# Patient Record
Sex: FEMALE | Race: WHITE | NOT HISPANIC OR LATINO
[De-identification: names, ages, dates, MRNs, and addresses within clinical notes are randomized per-mention and may not be internally consistent; named-entity substitution may affect disease eponyms.]

---

## 2017-12-12 PROBLEM — Z00.00 ENCOUNTER FOR PREVENTIVE HEALTH EXAMINATION: Status: ACTIVE | Noted: 2017-12-12

## 2017-12-29 ENCOUNTER — APPOINTMENT (OUTPATIENT)
Dept: CARDIOLOGY | Facility: CLINIC | Age: 82
End: 2017-12-29

## 2017-12-29 ENCOUNTER — APPOINTMENT (OUTPATIENT)
Dept: CARDIOLOGY | Facility: CLINIC | Age: 82
End: 2017-12-29
Payer: MEDICARE

## 2017-12-29 PROCEDURE — 99211 OFF/OP EST MAY X REQ PHY/QHP: CPT

## 2017-12-29 PROCEDURE — 85610 PROTHROMBIN TIME: CPT | Mod: QW

## 2018-01-11 ENCOUNTER — APPOINTMENT (OUTPATIENT)
Dept: CARDIOLOGY | Facility: CLINIC | Age: 83
End: 2018-01-11
Payer: MEDICARE

## 2018-01-11 PROCEDURE — 93000 ELECTROCARDIOGRAM COMPLETE: CPT

## 2018-01-11 PROCEDURE — 99214 OFFICE O/P EST MOD 30 MIN: CPT

## 2018-01-25 ENCOUNTER — RESULT REVIEW (OUTPATIENT)
Age: 83
End: 2018-01-25

## 2018-01-26 ENCOUNTER — RX RENEWAL (OUTPATIENT)
Age: 83
End: 2018-01-26

## 2018-01-26 LAB — INR PPP: 2.5

## 2018-02-08 ENCOUNTER — RESULT REVIEW (OUTPATIENT)
Age: 83
End: 2018-02-08

## 2018-02-08 LAB — INR PPP: 3

## 2018-02-22 ENCOUNTER — RX RENEWAL (OUTPATIENT)
Age: 83
End: 2018-02-22

## 2018-03-01 LAB — INR PPP: 4.9

## 2018-03-08 ENCOUNTER — MEDICATION RENEWAL (OUTPATIENT)
Age: 83
End: 2018-03-08

## 2018-03-09 LAB — INR PPP: 2.5

## 2018-03-23 LAB — INR PPP: 3.3

## 2018-04-09 ENCOUNTER — RECORD ABSTRACTING (OUTPATIENT)
Age: 83
End: 2018-04-09

## 2018-04-09 RX ORDER — ATORVASTATIN CALCIUM 10 MG/1
10 TABLET, FILM COATED ORAL DAILY
Refills: 0 | Status: ACTIVE | COMMUNITY

## 2018-04-09 RX ORDER — FUROSEMIDE 40 MG/1
40 TABLET ORAL DAILY
Qty: 90 | Refills: 0 | Status: ACTIVE | COMMUNITY

## 2018-04-10 ENCOUNTER — APPOINTMENT (OUTPATIENT)
Dept: CARDIOLOGY | Facility: CLINIC | Age: 83
End: 2018-04-10
Payer: MEDICARE

## 2018-04-10 VITALS
SYSTOLIC BLOOD PRESSURE: 128 MMHG | HEIGHT: 60 IN | DIASTOLIC BLOOD PRESSURE: 80 MMHG | BODY MASS INDEX: 32 KG/M2 | HEART RATE: 69 BPM | RESPIRATION RATE: 16 BRPM | WEIGHT: 163 LBS

## 2018-04-10 PROCEDURE — 99214 OFFICE O/P EST MOD 30 MIN: CPT

## 2018-04-10 PROCEDURE — 93000 ELECTROCARDIOGRAM COMPLETE: CPT

## 2018-04-16 LAB — INR PPP: 2.2

## 2018-04-20 LAB — INR PPP: 2.8

## 2018-05-08 LAB — INR PPP: 2.8

## 2018-05-17 ENCOUNTER — RESULT REVIEW (OUTPATIENT)
Age: 83
End: 2018-05-17

## 2018-05-17 LAB — INR PPP: 2.5

## 2018-06-04 ENCOUNTER — MEDICATION RENEWAL (OUTPATIENT)
Age: 83
End: 2018-06-04

## 2018-06-05 ENCOUNTER — MEDICATION RENEWAL (OUTPATIENT)
Age: 83
End: 2018-06-05

## 2018-06-21 LAB
INR PPP: 2.1
INR PPP: 2.4

## 2018-06-28 ENCOUNTER — RESULT REVIEW (OUTPATIENT)
Age: 83
End: 2018-06-28

## 2018-06-28 LAB — INR PPP: 1.9

## 2018-07-03 ENCOUNTER — APPOINTMENT (OUTPATIENT)
Dept: CARDIOLOGY | Facility: CLINIC | Age: 83
End: 2018-07-03
Payer: MEDICARE

## 2018-07-03 PROCEDURE — 93880 EXTRACRANIAL BILAT STUDY: CPT

## 2018-07-03 PROCEDURE — 93306 TTE W/DOPPLER COMPLETE: CPT

## 2018-07-10 ENCOUNTER — APPOINTMENT (OUTPATIENT)
Dept: CARDIOLOGY | Facility: CLINIC | Age: 83
End: 2018-07-10
Payer: MEDICARE

## 2018-07-10 VITALS
RESPIRATION RATE: 14 BRPM | SYSTOLIC BLOOD PRESSURE: 108 MMHG | DIASTOLIC BLOOD PRESSURE: 67 MMHG | WEIGHT: 162 LBS | HEIGHT: 60 IN | BODY MASS INDEX: 31.8 KG/M2 | HEART RATE: 70 BPM

## 2018-07-10 PROCEDURE — 93000 ELECTROCARDIOGRAM COMPLETE: CPT

## 2018-07-10 PROCEDURE — 99214 OFFICE O/P EST MOD 30 MIN: CPT

## 2018-07-10 RX ORDER — WARFARIN 2 MG/1
2 TABLET ORAL
Qty: 90 | Refills: 1 | Status: DISCONTINUED | COMMUNITY
Start: 2018-06-05 | End: 2018-07-10

## 2018-07-10 RX ORDER — WARFARIN 1 MG/1
1 TABLET ORAL
Qty: 90 | Refills: 1 | Status: DISCONTINUED | COMMUNITY
Start: 2018-06-04 | End: 2018-07-10

## 2018-07-10 RX ORDER — WARFARIN SODIUM 3 MG/1
3 TABLET ORAL
Refills: 0 | Status: DISCONTINUED | COMMUNITY
End: 2018-07-10

## 2018-07-20 ENCOUNTER — RX RENEWAL (OUTPATIENT)
Age: 83
End: 2018-07-20

## 2018-07-24 LAB — INR PPP: 2

## 2018-07-26 LAB — INR PPP: 1.7

## 2018-08-06 LAB — INR PPP: 1.9

## 2018-08-20 LAB — INR PPP: 2.8

## 2018-09-05 LAB — INR PPP: 3

## 2018-09-13 ENCOUNTER — MEDICATION RENEWAL (OUTPATIENT)
Age: 83
End: 2018-09-13

## 2018-09-13 ENCOUNTER — RX RENEWAL (OUTPATIENT)
Age: 83
End: 2018-09-13

## 2018-09-19 LAB — INR PPP: 2

## 2018-10-15 LAB — INR PPP: 1.9

## 2018-10-29 ENCOUNTER — MEDICATION RENEWAL (OUTPATIENT)
Age: 83
End: 2018-10-29

## 2018-10-29 LAB — INR PPP: 3.3

## 2018-11-01 ENCOUNTER — RECORD ABSTRACTING (OUTPATIENT)
Age: 83
End: 2018-11-01

## 2018-11-01 DIAGNOSIS — Z78.9 OTHER SPECIFIED HEALTH STATUS: ICD-10-CM

## 2018-11-08 ENCOUNTER — APPOINTMENT (OUTPATIENT)
Dept: CARDIOLOGY | Facility: CLINIC | Age: 83
End: 2018-11-08
Payer: MEDICARE

## 2018-11-08 VITALS
HEIGHT: 60 IN | HEART RATE: 76 BPM | WEIGHT: 163 LBS | DIASTOLIC BLOOD PRESSURE: 60 MMHG | RESPIRATION RATE: 14 BRPM | BODY MASS INDEX: 32 KG/M2 | SYSTOLIC BLOOD PRESSURE: 103 MMHG

## 2018-11-08 LAB — INR PPP: 3.1 RATIO

## 2018-11-08 PROCEDURE — 99214 OFFICE O/P EST MOD 30 MIN: CPT

## 2018-11-08 PROCEDURE — 93000 ELECTROCARDIOGRAM COMPLETE: CPT

## 2018-11-08 NOTE — ASSESSMENT
[FreeTextEntry1] : EKG demonstrating atrial fibrillation with a moderate ventricular rate in the 70s there is a incomplete right bundle branch block pattern with baseline artifact and no acute changes;\par \par In summary the patient is a 88-year-old white female with progressive dementia noted under our care for chronic atrial fibrillation, heart failure with preserved systolic ejection fraction and diastolic dysfunction who was had a recent stable cardiac pattern on current multiple medical regimen;\par \par Plan:\par \par Patient recommended to continue current medical regimen;\par \par Discussed with daughter, to report any untoward lightheadedness dizziness or falling;\par \par Continue to maintain time the PT INR checks on warfarin;\par \par Periodic laboratory blood tests and checkups with primary care encouraged

## 2018-11-08 NOTE — PHYSICAL EXAM
[General Appearance - Well Developed] : well developed [Normal Appearance] : normal appearance [Well Groomed] : well groomed [General Appearance - Well Nourished] : well nourished [No Deformities] : no deformities [General Appearance - In No Acute Distress] : no acute distress [Normal Conjunctiva] : the conjunctiva exhibited no abnormalities [Eyelids - No Xanthelasma] : the eyelids demonstrated no xanthelasmas [Normal Oral Mucosa] : normal oral mucosa [No Oral Pallor] : no oral pallor [Normal Jugular Venous A Waves Present] : normal jugular venous A waves present [Normal Jugular Venous V Waves Present] : normal jugular venous V waves present [No Jugular Venous [place] A Waves] : no jugular venous [place] A waves [Respiration, Rhythm And Depth] : normal respiratory rhythm and effort [Auscultation Breath Sounds / Voice Sounds] : lungs were clear to auscultation bilaterally [Abnormal Walk] : normal gait [Nail Clubbing] : no clubbing of the fingernails [Cyanosis, Localized] : no localized cyanosis [Petechial Hemorrhages (___cm)] : no petechial hemorrhages [Skin Color & Pigmentation] : normal skin color and pigmentation [] : no rash [No Venous Stasis] : no venous stasis [Skin Lesions] : no skin lesions [No Skin Ulcers] : no skin ulcer [No Xanthoma] : no  xanthoma was observed [Oriented To Time, Place, And Person] : oriented to person, place, and time [Affect] : the affect was normal [Mood] : the mood was normal [FreeTextEntry1] : questionable noticeable memory loss

## 2018-11-08 NOTE — REASON FOR VISIT
[Follow-Up - Clinic] : a clinic follow-up of [FreeTextEntry1] : The patient is an 80-year-old white female who is been under our care for a long-standing history of chronic atrial fibrillation, diastolic dysfunction with HFpEF, and peripheral edema who presents back to the office today for general cardiac checkup;\par \par She is accompanied by her daughter who reports that her dementia seems to be worsening with worsening memory both recent and remote;;\par \par Otherwise, there is been no reported PND or orthopnea, chest pain, dizziness or syncope;,

## 2018-11-29 LAB — INR PPP: 2.9

## 2018-12-10 ENCOUNTER — MEDICATION RENEWAL (OUTPATIENT)
Age: 83
End: 2018-12-10

## 2018-12-10 ENCOUNTER — RESULT REVIEW (OUTPATIENT)
Age: 83
End: 2018-12-10

## 2018-12-10 LAB — INR PPP: 3.7

## 2018-12-17 LAB — INR PPP: 2.2

## 2019-01-14 ENCOUNTER — RESULT REVIEW (OUTPATIENT)
Age: 84
End: 2019-01-14

## 2019-01-15 ENCOUNTER — MEDICATION RENEWAL (OUTPATIENT)
Age: 84
End: 2019-01-15

## 2019-01-15 LAB — INR PPP: 2.6

## 2019-01-22 LAB — INR PPP: 2.3

## 2019-01-28 ENCOUNTER — RESULT REVIEW (OUTPATIENT)
Age: 84
End: 2019-01-28

## 2019-01-28 LAB — INR PPP: 2.8

## 2019-02-11 LAB — INR PPP: 2.5

## 2019-02-25 LAB — INR PPP: 3

## 2019-03-11 ENCOUNTER — RESULT REVIEW (OUTPATIENT)
Age: 84
End: 2019-03-11

## 2019-03-14 ENCOUNTER — APPOINTMENT (OUTPATIENT)
Dept: CARDIOLOGY | Facility: CLINIC | Age: 84
End: 2019-03-14
Payer: MEDICARE

## 2019-03-14 ENCOUNTER — NON-APPOINTMENT (OUTPATIENT)
Age: 84
End: 2019-03-14

## 2019-03-14 VITALS
SYSTOLIC BLOOD PRESSURE: 115 MMHG | WEIGHT: 161 LBS | DIASTOLIC BLOOD PRESSURE: 80 MMHG | RESPIRATION RATE: 14 BRPM | HEIGHT: 60 IN | HEART RATE: 70 BPM | BODY MASS INDEX: 31.61 KG/M2

## 2019-03-14 LAB — INR PPP: 3.1

## 2019-03-14 PROCEDURE — 99214 OFFICE O/P EST MOD 30 MIN: CPT

## 2019-03-14 PROCEDURE — 93000 ELECTROCARDIOGRAM COMPLETE: CPT

## 2019-03-14 NOTE — ASSESSMENT
[FreeTextEntry1] : EKG shows atrial fibrillation with a moderate ventricular rate, slight RSR prime V1 and V2 with some nonspecific ST-T changes and general low-voltage tracing;\par \par In summary the patient is an 88-year-old white female with a known history of chronic atrial fibrillation, HFpEF and mostly stable cardiac pattern--who is been sedentary and has a few diffuse arthritic complaints but has been stable on anticoagulation with warfarin (INR today is 3.1);\par \par Plan:\par \par Patient recommended to continue current medical regimen plus warfarin;\par \par Nature do some extra green vegetables in diet and recheck INR within 4 weeks\par \par No additional cardiac workup indicated at this time\par \par Followup this office was informed within 3-4 months;\par \par may try arthritic joint formula glucosamine etc;

## 2019-03-14 NOTE — REASON FOR VISIT
[Follow-Up - Clinic] : a clinic follow-up of [FreeTextEntry1] : The patient is an 88-year-old white female with long-standing history of atrial fibrillation, diastolic dysfunction with heart failure with preserved ejection fraction (HFpEF);\par \par She returns to the office today for general cardiac checkup and is accompanied with her daughter;\par \par Although she has been very sedentary lately, her daughter reports that she has not complained of any significant chest pain, shortness of breath, palpitations, dizziness or syncope;\par \par She has early dementia and some memory loss,  however;

## 2019-03-25 ENCOUNTER — RESULT REVIEW (OUTPATIENT)
Age: 84
End: 2019-03-25

## 2019-03-25 LAB — INR PPP: 2.6

## 2019-04-09 ENCOUNTER — RESULT REVIEW (OUTPATIENT)
Age: 84
End: 2019-04-09

## 2019-04-12 LAB — INR PPP: 3.7

## 2019-04-15 ENCOUNTER — RESULT REVIEW (OUTPATIENT)
Age: 84
End: 2019-04-15

## 2019-04-15 LAB — INR PPP: 2.5

## 2019-05-13 ENCOUNTER — RESULT REVIEW (OUTPATIENT)
Age: 84
End: 2019-05-13

## 2019-05-13 LAB — INR PPP: 2.5

## 2019-05-20 ENCOUNTER — RX RENEWAL (OUTPATIENT)
Age: 84
End: 2019-05-20

## 2019-05-20 ENCOUNTER — MEDICATION RENEWAL (OUTPATIENT)
Age: 84
End: 2019-05-20

## 2019-05-29 ENCOUNTER — RX RENEWAL (OUTPATIENT)
Age: 84
End: 2019-05-29

## 2019-05-29 LAB — INR PPP: 2.5

## 2019-06-10 ENCOUNTER — RX RENEWAL (OUTPATIENT)
Age: 84
End: 2019-06-10

## 2019-06-10 ENCOUNTER — RESULT REVIEW (OUTPATIENT)
Age: 84
End: 2019-06-10

## 2019-06-11 ENCOUNTER — RX RENEWAL (OUTPATIENT)
Age: 84
End: 2019-06-11

## 2019-06-11 LAB — INR PPP: 2.2

## 2019-06-25 ENCOUNTER — RESULT REVIEW (OUTPATIENT)
Age: 84
End: 2019-06-25

## 2019-06-25 LAB — INR PPP: 2.1

## 2019-06-27 ENCOUNTER — RX RENEWAL (OUTPATIENT)
Age: 84
End: 2019-06-27

## 2019-07-08 ENCOUNTER — RESULT REVIEW (OUTPATIENT)
Age: 84
End: 2019-07-08

## 2019-07-11 LAB — INR PPP: 2.7

## 2019-07-18 ENCOUNTER — NON-APPOINTMENT (OUTPATIENT)
Age: 84
End: 2019-07-18

## 2019-07-18 ENCOUNTER — APPOINTMENT (OUTPATIENT)
Dept: CARDIOLOGY | Facility: CLINIC | Age: 84
End: 2019-07-18
Payer: MEDICARE

## 2019-07-18 VITALS
DIASTOLIC BLOOD PRESSURE: 70 MMHG | WEIGHT: 154 LBS | HEART RATE: 65 BPM | SYSTOLIC BLOOD PRESSURE: 106 MMHG | BODY MASS INDEX: 30.23 KG/M2 | HEIGHT: 60 IN | RESPIRATION RATE: 14 BRPM

## 2019-07-18 DIAGNOSIS — R05 COUGH: ICD-10-CM

## 2019-07-18 PROCEDURE — 93000 ELECTROCARDIOGRAM COMPLETE: CPT

## 2019-07-18 PROCEDURE — 99214 OFFICE O/P EST MOD 30 MIN: CPT

## 2019-07-18 NOTE — HISTORY OF PRESENT ILLNESS
[FreeTextEntry1] : Otherwise, there is been no reported chest pain, palpitations, PND or orthopnea;\par \par She's been taking her other medications regularly but has not seen a primary care for this cough which seemed to affect other family members recently as well;

## 2019-07-18 NOTE — REASON FOR VISIT
[Follow-Up - Clinic] : a clinic follow-up of [FreeTextEntry1] : The patient is an 80-year-old white female with a long-standing history of chronic/permanent nature fibrillation, diastolic dysfunction and prior heart failure with preserved ejection fraction (HFpEF);\par \par She presents back to the office today for general cardiac checkup and accompanied with her daughter. The patient does have some advancing dementia and daughter reports that she has had a bronchitis-like cough over the past several days which waxes and wanes.

## 2019-07-18 NOTE — ASSESSMENT
[FreeTextEntry1] : EKG shows atrial fib/flutter with a moderate ventricular rate in the 60s;\par \par in summary this 88-year-old woman with a history of chronic atrial fibrillation diastolic dysfunction and prior history of heart failure with some underlying pulmonary congestion and; likely bronclitis-to rule-out pneumonia today with audible wheezing and rhonchi and productive cough;\par \par \par Plan:\par \par Will empirically Rx Z-Abhi 5 day supply.;\par \par Okay to resume antitussives medication\par \par Recommend followup with primary care or ER visits if not improved\par \par Will add laboratory blood tests and chest x-ray for today to further assess;;;\par \par Followup within 3 months or p.r.n.

## 2019-07-18 NOTE — REVIEW OF SYSTEMS
[Dyspnea on exertion] : dyspnea during exertion [Cough] : cough [Wheezing] : wheezing [Negative] : Heme/Lymph

## 2019-07-22 ENCOUNTER — RESULT REVIEW (OUTPATIENT)
Age: 84
End: 2019-07-22

## 2019-07-22 LAB — INR PPP: 2.4

## 2019-08-05 ENCOUNTER — RESULT REVIEW (OUTPATIENT)
Age: 84
End: 2019-08-05

## 2019-08-05 LAB — INR PPP: 2.6

## 2019-08-19 ENCOUNTER — RX RENEWAL (OUTPATIENT)
Age: 84
End: 2019-08-19

## 2019-08-19 LAB — INR PPP: 2.5

## 2019-08-22 ENCOUNTER — RESULT REVIEW (OUTPATIENT)
Age: 84
End: 2019-08-22

## 2019-09-04 LAB — INR PPP: 2.1

## 2019-09-09 ENCOUNTER — RX RENEWAL (OUTPATIENT)
Age: 84
End: 2019-09-09

## 2019-09-16 ENCOUNTER — RESULT REVIEW (OUTPATIENT)
Age: 84
End: 2019-09-16

## 2019-09-16 LAB — INR PPP: 2.2

## 2019-09-23 ENCOUNTER — RX RENEWAL (OUTPATIENT)
Age: 84
End: 2019-09-23

## 2019-09-30 LAB — INR PPP: 2.6

## 2019-10-14 ENCOUNTER — RX RENEWAL (OUTPATIENT)
Age: 84
End: 2019-10-14

## 2019-10-15 LAB — INR PPP: 3.1

## 2019-10-28 ENCOUNTER — RESULT REVIEW (OUTPATIENT)
Age: 84
End: 2019-10-28

## 2019-10-28 LAB — INR PPP: 3.2

## 2019-10-31 ENCOUNTER — RX RENEWAL (OUTPATIENT)
Age: 84
End: 2019-10-31

## 2019-11-11 ENCOUNTER — RESULT REVIEW (OUTPATIENT)
Age: 84
End: 2019-11-11

## 2019-11-11 LAB — INR PPP: 2.9

## 2019-11-14 ENCOUNTER — NON-APPOINTMENT (OUTPATIENT)
Age: 84
End: 2019-11-14

## 2019-11-14 ENCOUNTER — APPOINTMENT (OUTPATIENT)
Dept: CARDIOLOGY | Facility: CLINIC | Age: 84
End: 2019-11-14
Payer: MEDICARE

## 2019-11-14 VITALS
HEART RATE: 59 BPM | BODY MASS INDEX: 29.84 KG/M2 | WEIGHT: 152 LBS | RESPIRATION RATE: 14 BRPM | HEIGHT: 60 IN | SYSTOLIC BLOOD PRESSURE: 120 MMHG | DIASTOLIC BLOOD PRESSURE: 70 MMHG

## 2019-11-14 PROCEDURE — 93000 ELECTROCARDIOGRAM COMPLETE: CPT

## 2019-11-14 PROCEDURE — 99214 OFFICE O/P EST MOD 30 MIN: CPT

## 2019-11-14 RX ORDER — ASPIRIN 81 MG
81 TABLET, DELAYED RELEASE (ENTERIC COATED) ORAL DAILY
Refills: 0 | Status: DISCONTINUED | COMMUNITY
End: 2019-11-14

## 2019-11-14 RX ORDER — PANTOPRAZOLE SODIUM 40 MG/1
40 TABLET, DELAYED RELEASE ORAL DAILY
Qty: 90 | Refills: 3 | Status: DISCONTINUED | COMMUNITY
End: 2019-11-14

## 2019-11-14 RX ORDER — AZITHROMYCIN 250 MG/1
250 TABLET, FILM COATED ORAL
Qty: 6 | Refills: 3 | Status: DISCONTINUED | COMMUNITY
Start: 2019-07-18 | End: 2019-11-14

## 2019-11-14 NOTE — REVIEW OF SYSTEMS
[Lower Ext Edema] : lower extremity edema [Memory Lapses Or Loss] : memory lapses or loss [Negative] : Heme/Lymph

## 2019-11-14 NOTE — ASSESSMENT
[FreeTextEntry1] : EKG demonstrating atrial fibrillation with a slow to moderate ventricular rate;\par \par In summary the patient is an 89-year-old woman with a history of chronic/permanent atrial fibrillation, taking anticoagulation with warfarin demonstrates a mild bradycardia on EKG today and noted hypothyroidism significantly elevated TSH;\par \par Plan:\par \par Patient recommended to continue to maintain current medical regimen and may increase Lasix to 60 mg daily x3-4 days and then return to 40 mg\par \par Continue metazlone as before weekly;\par \par Recommended followup with endocrinologist in the near future to get thyroid  under control-- which may possibly improve the edema, fatigue, and bradycardia;;\par \par followup within 3-4 months or p.r.n.\par \par ;;

## 2019-11-14 NOTE — REASON FOR VISIT
[Follow-Up - Clinic] : a clinic follow-up of [FreeTextEntry1] : The patient is an 89-year-old white female with a known history of chronic/permanent H2 fibrillation who has been on warfarin therapy and also has a prior history of CHF (HFpEF );\par \par Patient returns for general cardiac checkup and is accompanied with her daughter. Her daughter reports that she does have some advancing Alzheimer's dementia;\par  \par Although there is been no significant symptoms or shortness of breath, complaints of chest pain or palpitations, denies dizziness or syncope-she has noted some progressive ankle edema right greater than the left;\par

## 2019-11-14 NOTE — PHYSICAL EXAM
[General Appearance - Well Developed] : well developed [Normal Appearance] : normal appearance [Well Groomed] : well groomed [General Appearance - In No Acute Distress] : no acute distress [General Appearance - Well Nourished] : well nourished [No Deformities] : no deformities [Eyelids - No Xanthelasma] : the eyelids demonstrated no xanthelasmas [Normal Conjunctiva] : the conjunctiva exhibited no abnormalities [Normal Oral Mucosa] : normal oral mucosa [No Oral Pallor] : no oral pallor [No Jugular Venous [place] A Waves] : no jugular venous spain A waves [Normal Jugular Venous A Waves Present] : normal jugular venous A waves present [Normal Jugular Venous V Waves Present] : normal jugular venous V waves present [Auscultation Breath Sounds / Voice Sounds] : lungs were clear to auscultation bilaterally [Respiration, Rhythm And Depth] : normal respiratory rhythm and effort [Nail Clubbing] : no clubbing of the fingernails [Abnormal Walk] : normal gait [Cyanosis, Localized] : no localized cyanosis [Petechial Hemorrhages (___cm)] : no petechial hemorrhages [Skin Color & Pigmentation] : normal skin color and pigmentation [Fingers Osler's Nodes] : Osler's nodes were not seenon the fingers [Skin Lesions] : no skin lesions [No Skin Ulcers] : no skin ulcer [] : no rash [No Venous Stasis] : no venous stasis [No Xanthoma] : no  xanthoma was observed [Oriented To Time, Place, And Person] : oriented to person, place, and time [Mood] : the mood was normal [Affect] : the affect was normal [FreeTextEntry1] : questionable noticeable memory loss

## 2019-11-14 NOTE — HISTORY OF PRESENT ILLNESS
[FreeTextEntry1] : Her daughter says she tries to be careful with any extra sodium in her diet and has been taking her medications and diuretics regularly;\par \par \par She presents with some updated laboratory blood tests today and showing only borderline renal insufficiency-- but noted significantly elevated TSH-- indicating hypothyroid;\par

## 2019-11-25 ENCOUNTER — RESULT REVIEW (OUTPATIENT)
Age: 84
End: 2019-11-25

## 2019-11-26 LAB — INR PPP: 3.8

## 2019-12-02 ENCOUNTER — RESULT REVIEW (OUTPATIENT)
Age: 84
End: 2019-12-02

## 2019-12-02 LAB — INR PPP: 3

## 2019-12-03 ENCOUNTER — RX RENEWAL (OUTPATIENT)
Age: 84
End: 2019-12-03

## 2019-12-16 ENCOUNTER — RESULT REVIEW (OUTPATIENT)
Age: 84
End: 2019-12-16

## 2020-01-01 ENCOUNTER — RX RENEWAL (OUTPATIENT)
Age: 85
End: 2020-01-01

## 2020-01-01 ENCOUNTER — APPOINTMENT (OUTPATIENT)
Dept: CARDIOLOGY | Facility: CLINIC | Age: 85
End: 2020-01-01

## 2020-01-01 ENCOUNTER — APPOINTMENT (OUTPATIENT)
Dept: CARDIOLOGY | Facility: CLINIC | Age: 85
End: 2020-01-01
Payer: MEDICARE

## 2020-01-01 DIAGNOSIS — E78.5 HYPERLIPIDEMIA, UNSPECIFIED: ICD-10-CM

## 2020-01-01 DIAGNOSIS — I50.9 HEART FAILURE, UNSPECIFIED: ICD-10-CM

## 2020-01-01 DIAGNOSIS — I27.20 PULMONARY HYPERTENSION, UNSPECIFIED: ICD-10-CM

## 2020-01-01 DIAGNOSIS — I51.89 OTHER ILL-DEFINED HEART DISEASES: ICD-10-CM

## 2020-01-01 DIAGNOSIS — I10 ESSENTIAL (PRIMARY) HYPERTENSION: ICD-10-CM

## 2020-01-01 DIAGNOSIS — I65.29 OCCLUSION AND STENOSIS OF UNSPECIFIED CAROTID ARTERY: ICD-10-CM

## 2020-01-01 DIAGNOSIS — I48.20 CHRONIC ATRIAL FIBRILLATION, UNSP: ICD-10-CM

## 2020-01-01 LAB
INR PPP: 1.6
INR PPP: 1.9
INR PPP: 2.1
INR PPP: 2.1
INR PPP: 2.2
INR PPP: 2.4
INR PPP: 2.5
INR PPP: 2.9
INR PPP: 2.9
INR PPP: 3
INR PPP: 3.1
INR PPP: 3.2
INR PPP: 3.4
INR PPP: 3.5
INR PPP: 3.6
INR PPP: 3.6
INR PPP: 3.7
INR PPP: 3.8
INR PPP: 3.8
INR PPP: 4
INR PPP: 4.1
INR PPP: 4.5
INR PPP: 6.2
PROTHROMBIN TIME COMMENT: NORMAL

## 2020-01-01 PROCEDURE — 93306 TTE W/DOPPLER COMPLETE: CPT

## 2020-01-01 PROCEDURE — 93880 EXTRACRANIAL BILAT STUDY: CPT

## 2020-01-01 PROCEDURE — 99443: CPT | Mod: 95

## 2020-01-01 PROCEDURE — 99214 OFFICE O/P EST MOD 30 MIN: CPT | Mod: 95

## 2020-01-01 PROCEDURE — 99441: CPT | Mod: 95

## 2020-01-01 RX ORDER — WARFARIN 1 MG/1
1 TABLET ORAL DAILY
Qty: 45 | Refills: 1 | Status: DISCONTINUED | COMMUNITY
End: 2020-01-01

## 2020-01-01 RX ORDER — WARFARIN 2 MG/1
2 TABLET ORAL
Qty: 90 | Refills: 0 | Status: DISCONTINUED | COMMUNITY
Start: 2020-01-01 | End: 2020-01-01

## 2020-01-01 RX ORDER — WARFARIN 2 MG/1
2 TABLET ORAL DAILY
Qty: 45 | Refills: 1 | Status: ACTIVE | COMMUNITY
Start: 2018-12-10 | End: 1900-01-01

## 2020-01-01 RX ORDER — FUROSEMIDE 20 MG/1
20 TABLET ORAL
Qty: 180 | Refills: 3 | Status: ACTIVE | COMMUNITY
Start: 2018-07-20 | End: 1900-01-01

## 2020-01-01 RX ORDER — METOLAZONE 2.5 MG/1
2.5 TABLET ORAL
Qty: 12 | Refills: 1 | Status: ACTIVE | COMMUNITY
Start: 2019-05-29 | End: 1900-01-01

## 2020-01-01 RX ORDER — NEBIVOLOL HYDROCHLORIDE 5 MG/1
5 TABLET ORAL
Qty: 90 | Refills: 1 | Status: ACTIVE | COMMUNITY
Start: 2018-03-08 | End: 1900-01-01

## 2020-01-01 RX ORDER — WARFARIN SODIUM 2.5 MG/1
2.5 TABLET ORAL
Refills: 0 | Status: DISCONTINUED | COMMUNITY
End: 2020-01-01

## 2020-01-01 RX ORDER — ASPIRIN ENTERIC COATED TABLETS 81 MG 81 MG/1
81 TABLET, DELAYED RELEASE ORAL
Refills: 0 | Status: DISCONTINUED | COMMUNITY
End: 2020-01-01

## 2020-01-02 LAB
INR PPP: 3.2
INR PPP: 3.3

## 2020-01-07 LAB — INR PPP: 2.5

## 2020-01-20 LAB — INR PPP: 4.1

## 2020-01-28 LAB — INR PPP: 2.8

## 2020-02-11 LAB — INR PPP: 3.4

## 2020-02-13 ENCOUNTER — RX RENEWAL (OUTPATIENT)
Age: 85
End: 2020-02-13

## 2020-02-17 LAB — INR PPP: 3

## 2020-02-27 ENCOUNTER — NON-APPOINTMENT (OUTPATIENT)
Age: 85
End: 2020-02-27

## 2020-02-27 ENCOUNTER — APPOINTMENT (OUTPATIENT)
Dept: CARDIOLOGY | Facility: CLINIC | Age: 85
End: 2020-02-27
Payer: MEDICARE

## 2020-02-27 VITALS
DIASTOLIC BLOOD PRESSURE: 76 MMHG | BODY MASS INDEX: 32.58 KG/M2 | SYSTOLIC BLOOD PRESSURE: 126 MMHG | HEIGHT: 57 IN | RESPIRATION RATE: 14 BRPM | HEART RATE: 63 BPM | WEIGHT: 151 LBS

## 2020-02-27 PROCEDURE — 99214 OFFICE O/P EST MOD 30 MIN: CPT

## 2020-02-27 PROCEDURE — 93000 ELECTROCARDIOGRAM COMPLETE: CPT

## 2020-02-27 NOTE — PHYSICAL EXAM
[General Appearance - Well Developed] : well developed [Normal Appearance] : normal appearance [Well Groomed] : well groomed [General Appearance - Well Nourished] : well nourished [No Deformities] : no deformities [General Appearance - In No Acute Distress] : no acute distress [Normal Conjunctiva] : the conjunctiva exhibited no abnormalities [Eyelids - No Xanthelasma] : the eyelids demonstrated no xanthelasmas [Normal Oral Mucosa] : normal oral mucosa [Normal Jugular Venous A Waves Present] : normal jugular venous A waves present [No Oral Pallor] : no oral pallor [Normal Jugular Venous V Waves Present] : normal jugular venous V waves present [No Jugular Venous [place] A Waves] : no jugular venous psain A waves [Respiration, Rhythm And Depth] : normal respiratory rhythm and effort [Auscultation Breath Sounds / Voice Sounds] : lungs were clear to auscultation bilaterally [Abnormal Walk] : normal gait [Nail Clubbing] : no clubbing of the fingernails [Petechial Hemorrhages (___cm)] : no petechial hemorrhages [Cyanosis, Localized] : no localized cyanosis [Fingers Osler's Nodes] : Osler's nodes were not seenon the fingers [No Venous Stasis] : no venous stasis [] : no rash [Skin Color & Pigmentation] : normal skin color and pigmentation [Skin Lesions] : no skin lesions [No Skin Ulcers] : no skin ulcer [No Xanthoma] : no  xanthoma was observed [Oriented To Time, Place, And Person] : oriented to person, place, and time [Mood] : the mood was normal [FreeTextEntry1] : questionable noticeable memory loss [Affect] : the affect was normal

## 2020-02-27 NOTE — HISTORY OF PRESENT ILLNESS
[FreeTextEntry1] : There has been ongoing edema of the lower extremities the right greater than the left. She is on Lasix and once a week metolazone;\par \par Her daughter states that sometimes she gives extra Lasix if the leg edema seems worse once or twice a week;

## 2020-02-27 NOTE — REVIEW OF SYSTEMS
[Lower Ext Edema] : lower extremity edema [Dyspnea on exertion] : dyspnea during exertion [Confusion] : confusion was observed [Memory Lapses Or Loss] : memory lapses or loss [Negative] : Heme/Lymph

## 2020-02-27 NOTE — ASSESSMENT
[FreeTextEntry1] : EKG demonstrates atrial fibrillation with a slow to moderate ventricular rate, late R wave transition V1 to V2 and otherwise no acute changes with low voltage tracing;\par \par In summary the patient is an 89-year-old woman who has a history of heart failure with preserved ejection fraction in the past, chronic atrial fibrillation, and peripheral edema the lower extremities which has been fairly stable on her current multiple medical regimen\par \par \par \par Plan:\par \par Patient recommended to continue diuretics and current medical regimen\par \par Reinforced low-sodium diet\par \par Modest walking activity as tolerated\par \par Continue warfarin and regular PT/INR checks\par \par Encouraged to followup for evidence of hypothyroid and to treat this if necessary ;\par \par Return to this office within 3-4 months or p.r.n. ;;;;;;;

## 2020-02-27 NOTE — REASON FOR VISIT
[FreeTextEntry1] : The patient is an 89-year-old white female with a known history for chronic/permanent atrial fibrillation who is been on chronic warfarin therapy with prior history of mild heart failure (HFpEF);\par \par She presents back to the office today for general checkup and is accompanied with her daughter.;\par \par The daughter reports that she is in the process of having her thyroid evaluated because of recent evidence of hypothyroid;\par \par Fortunately, there is been no significant chest pain, palpitations, dizziness or syncope\par \par The patient does get some exertional dyspnea however but has been very sedentary and this seems to be about the same for her;; [Follow-Up - Clinic] : a clinic follow-up of

## 2020-04-16 NOTE — PHYSICAL EXAM
[General Appearance - Well Developed] : well developed [General Appearance - Well Nourished] : well nourished [General Appearance - In No Acute Distress] : no acute distress [] : no respiratory distress [FreeTextEntry1] : Mild to moderate dementia

## 2020-04-16 NOTE — REVIEW OF SYSTEMS
[Lower Ext Edema] : lower extremity edema [Joint Stiffness] : joint stiffness [Memory Lapses Or Loss] : memory lapses or loss [Easy Bleeding] : a tendency for easy bleeding

## 2020-04-16 NOTE — HISTORY OF PRESENT ILLNESS
[Home] : at home, [unfilled] , at the time of the visit. [Medical Office: ([facility])___] : at the medical office located in  [Family Member] : family member [Patient] : the patient [FreeTextEntry1] : The patient is an 89-year-old white female who had telemed health visit today (poor video connection)\par \par the daughter states that the patient was briefly hospitalized for an abscess/hematoma in the abdomen after a fall at home while she was on warfarin;\par \par It was drained and she has convalesced well and has had no falling issues since;\par \par Past history includes diastolic heart failure, chronic/permanent atrial fibrillation on warfarin; (warfarin being held last week or so);\par \par there is been some slight additional edema on top of her chronic edema of the lower extremities as well;

## 2020-04-16 NOTE — ASSESSMENT
[FreeTextEntry1] : In summary this is an 89-year-old woman with history of moderate dementia and occasional falling episodes with recent sustained abdominal injury with small hematoma drained at hospital while Coumadin on hold for history of chronic/permanent atrial fibrillation;\par \par Plan:\par \par Okay to restart warfarin at 2 mg daily and check INR within 5 days;\par \par Okay to discontinue aspirin after 3-4 days;\par \par Additional precautions enumerated with patient and daughter about risk of falling and the fact that warfarin may have to be discontinued if she continues to have falling episodes;\par \par Okay to add extra Lasix (total 60 mg daily x3 days); mobilize with assistance as tolerated and keeping legs elevated on chair or cushion when sitting for long periods of time;\par \par Return to this office within 6-8 weeks or p.r.n.;

## 2020-06-29 PROBLEM — I27.20 PULMONARY HYPERTENSION: Status: ACTIVE | Noted: 2018-04-09

## 2020-06-29 PROBLEM — I51.89 DIASTOLIC DYSFUNCTION: Status: ACTIVE | Noted: 2018-04-09

## 2020-10-12 PROBLEM — I65.29 CAROTID ARTERY STENOSIS: Status: ACTIVE | Noted: 2020-01-01

## 2020-10-29 PROBLEM — E78.5 HYPERLIPEMIA: Status: ACTIVE | Noted: 2018-04-09

## 2020-10-29 PROBLEM — I48.20 CHRONIC ATRIAL FIBRILLATION: Status: ACTIVE | Noted: 2018-03-08

## 2020-10-29 PROBLEM — I10 HTN (HYPERTENSION): Status: ACTIVE | Noted: 2018-03-08

## 2020-10-29 PROBLEM — I50.9 CHF (CONGESTIVE HEART FAILURE): Status: ACTIVE | Noted: 2018-04-09

## 2021-01-01 ENCOUNTER — RX RENEWAL (OUTPATIENT)
Age: 86
End: 2021-01-01

## 2021-01-01 LAB
INR PPP: 2.9
INR PPP: 2.9
INR PPP: 6.9

## 2021-01-01 RX ORDER — ENALAPRIL MALEATE 5 MG/1
5 TABLET ORAL DAILY
Qty: 90 | Refills: 3 | Status: ACTIVE | COMMUNITY
Start: 2019-05-20 | End: 1900-01-01

## 2021-01-01 RX ORDER — WARFARIN 1 MG/1
1 TABLET ORAL
Qty: 90 | Refills: 1 | Status: ACTIVE | COMMUNITY
Start: 2020-01-01 | End: 1900-01-01

## 2021-03-02 ENCOUNTER — APPOINTMENT (OUTPATIENT)
Dept: CARDIOLOGY | Facility: CLINIC | Age: 86
End: 2021-03-02

## 2023-09-12 NOTE — HISTORY OF PRESENT ILLNESS
[FreeTextEntry1] : She is tolerating her current medication without difficulty.;\par \par She has occasional fluctuations in the INR but has had no overt bleeding or bruising noted;\par \par There has been no falling episodes fortunately;  she has, however, been rather sedentary;
Oriented - self; Oriented - place; Oriented - time